# Patient Record
Sex: FEMALE | Race: WHITE | NOT HISPANIC OR LATINO | Employment: FULL TIME | URBAN - METROPOLITAN AREA
[De-identification: names, ages, dates, MRNs, and addresses within clinical notes are randomized per-mention and may not be internally consistent; named-entity substitution may affect disease eponyms.]

---

## 2019-02-19 ENCOUNTER — TRANSFERRED RECORDS (OUTPATIENT)
Dept: HEALTH INFORMATION MANAGEMENT | Facility: CLINIC | Age: 29
End: 2019-02-19

## 2019-03-12 ENCOUNTER — ANCILLARY PROCEDURE (OUTPATIENT)
Dept: ULTRASOUND IMAGING | Facility: CLINIC | Age: 29
End: 2019-03-12
Payer: COMMERCIAL

## 2019-03-12 ENCOUNTER — OFFICE VISIT (OUTPATIENT)
Dept: OBGYN | Facility: CLINIC | Age: 29
End: 2019-03-12
Payer: COMMERCIAL

## 2019-03-12 VITALS
DIASTOLIC BLOOD PRESSURE: 70 MMHG | WEIGHT: 126 LBS | HEIGHT: 64 IN | SYSTOLIC BLOOD PRESSURE: 110 MMHG | BODY MASS INDEX: 21.51 KG/M2

## 2019-03-12 DIAGNOSIS — N83.209 CYST OF OVARY, UNSPECIFIED LATERALITY: ICD-10-CM

## 2019-03-12 DIAGNOSIS — Z97.5 PRESENCE OF INTRAUTERINE CONTRACEPTIVE DEVICE (IUD): ICD-10-CM

## 2019-03-12 DIAGNOSIS — N83.202 LEFT OVARIAN CYST: Primary | ICD-10-CM

## 2019-03-12 PROCEDURE — 76830 TRANSVAGINAL US NON-OB: CPT | Performed by: OBSTETRICS & GYNECOLOGY

## 2019-03-12 PROCEDURE — 99213 OFFICE O/P EST LOW 20 MIN: CPT | Performed by: OBSTETRICS & GYNECOLOGY

## 2019-03-12 ASSESSMENT — MIFFLIN-ST. JEOR: SCORE: 1291.53

## 2019-03-12 NOTE — PROGRESS NOTES
SUBJECTIVE:                                                   Cornelia Louis is a 28 year old female who presents to clinic today for the following health issue(s):  Patient presents with:  Ultrasound: follow-up ovarian cyst. Previous histroy of cyst and patient has starting having pelvic pain.      HPI:  Has IUD. Usually amenorrhea. Had LLQ pain on . Went to  and had ultrasound showing complex left ovarian cyst. Some free fluid.   Pain improved. Pt had spotting which she usually doesn't have.   Pain returned again so came here for repeat ultrasound.    No LMP recorded. Patient is not currently having periods (Reason: IUD)..   Patient is sexually active, .  Using IUD for contraception.    reports that she has quit smoking. she has never used smokeless tobacco.  STD testing offered?  Declined  Health maintenance updated:  due for pap, last pap 16        Problem list and histories reviewed & adjusted, as indicated.  Additional history: as documented.    Patient Active Problem List   Diagnosis     Supervision of normal first pregnancy     Marginal insertion of umbilical cord     Post-dates pregnancy     Indication for care in labor or delivery     Pregnancy     Past Surgical History:   Procedure Laterality Date     wisdom teeth        Social History     Tobacco Use     Smoking status: Former Smoker     Smokeless tobacco: Never Used   Substance Use Topics     Alcohol use: No     Alcohol/week: 0.0 oz      Problem (# of Occurrences) Relation (Name,Age of Onset)    Coronary Artery Disease (1) Unspecified    Fractures (1) Unspecified            Current Outpatient Medications   Medication Sig     levonorgestrel (MIRENA) 20 MCG/24HR IUD 1 each (20 mcg) by Intrauterine route once for 1 dose Lot [id number], Exp 2018     Prenatal Vit-Fe Fumarate-FA (PRENATAL MULTIVITAMIN  PLUS IRON) 27-0.8 MG TABS Take 1 tablet by mouth daily     No current facility-administered medications for this visit.   "    Facility-Administered Medications Ordered in Other Visits   Medication     fentaNYL (SUBLIMAZE) injection     lidocaine-EPINEPHrine 1.5 %-[id number] injection     ROPivacaine (NAROPIN) injection     No Known Allergies    ROS:  12 point review of systems negative other than symptoms noted below.  Head: Sore Throat  Gastrointestinal: Abdominal Pain and Bloating    OBJECTIVE:     /70   Ht 1.626 m (5' 4\")   Wt 57.2 kg (126 lb)   BMI 21.63 kg/m    Body mass index is 21.63 kg/m .    Exam:  Constitutional:  Appearance: Well nourished, well developed alert, in no acute distress  Neurologic/Psychiatric:  Mental Status:  Oriented X3      In-Clinic Test Results:  Results for orders placed or performed in visit on 03/12/19 (from the past 24 hour(s))   US Transvaginal Non OB    Narrative    US Transvaginal Non OB   Order #: [id number] Accession #: [id number]   Study Notes        Yakelin Mccarty on 3/12/2019  2:19 PM   Gynecological Ultrasound Report  Pelvic U/S - Transvaginal  [facility]  Referring Provider: Darian Ayers MD  Sonographer:  Yakelin Mccarty  Indication: Complex left ovarian cyst, left pelvic pain  LMP: No LMP recorded. Patient is not currently having periods (Reason:   IUD).  History:   Gynecological Ultrasonography:   Uterus: anteverted  Size: 6.80 x 5.80 x 3.32 cm  Findings: IUD normal placement    Endometrium: Thickness total 2.92 mm  Right Ovary: 3.95 x 2.75 x 2.63 cm   Left Ovary: 4.59 x 2.12 x 2.24 cm  Cul de Sac/Pouch of Arun:  Small amount of free fluid      Impression:Normal pelvic ultrasound. IUD in proper position. Small amount   of free fluid in culdesac. Outside ultrasound from 2/20/19 showed complex   left ovarian cyst. This has resolved.  Darian Ayers MD                          ASSESSMENT/PLAN:                                                        ICD-10-CM    1. Left ovarian cyst N83.202    2. Presence of intrauterine contraceptive device (IUD) Z97.5  "     Cyst has resolved. Pt most likely ovulated with cyst and then spotting. Will observe for now. Pain should go away.  Needs pap. Not done today due to ultrasound. Will make return appt.    Darian Ayers MD  [facility]

## 2019-04-02 ENCOUNTER — TRANSFERRED RECORDS (OUTPATIENT)
Dept: HEALTH INFORMATION MANAGEMENT | Facility: CLINIC | Age: 29
End: 2019-04-02

## 2019-06-11 ENCOUNTER — TRANSFERRED RECORDS (OUTPATIENT)
Dept: HEALTH INFORMATION MANAGEMENT | Facility: CLINIC | Age: 29
End: 2019-06-11

## 2020-01-13 ENCOUNTER — TRANSFERRED RECORDS (OUTPATIENT)
Dept: HEALTH INFORMATION MANAGEMENT | Facility: CLINIC | Age: 30
End: 2020-01-13

## 2021-06-14 ENCOUNTER — OFFICE VISIT (OUTPATIENT)
Dept: OBGYN | Facility: CLINIC | Age: 31
End: 2021-06-14
Payer: COMMERCIAL

## 2021-06-14 VITALS
RESPIRATION RATE: 17 BRPM | WEIGHT: 130.8 LBS | HEART RATE: 68 BPM | SYSTOLIC BLOOD PRESSURE: 120 MMHG | BODY MASS INDEX: 22.45 KG/M2 | DIASTOLIC BLOOD PRESSURE: 62 MMHG

## 2021-06-14 DIAGNOSIS — Z97.5 IUD (INTRAUTERINE DEVICE) IN PLACE: ICD-10-CM

## 2021-06-14 DIAGNOSIS — Z12.4 CERVICAL CANCER SCREENING: Primary | ICD-10-CM

## 2021-06-14 LAB — HCG UR QL: NEGATIVE

## 2021-06-14 PROCEDURE — 99212 OFFICE O/P EST SF 10 MIN: CPT | Performed by: OBSTETRICS & GYNECOLOGY

## 2021-06-14 PROCEDURE — 87624 HPV HI-RISK TYP POOLED RSLT: CPT | Performed by: OBSTETRICS & GYNECOLOGY

## 2021-06-14 PROCEDURE — 81025 URINE PREGNANCY TEST: CPT | Performed by: OBSTETRICS & GYNECOLOGY

## 2021-06-14 PROCEDURE — G0145 SCR C/V CYTO,THINLAYER,RESCR: HCPCS | Performed by: OBSTETRICS & GYNECOLOGY

## 2021-06-14 NOTE — PROGRESS NOTES
SUBJECTIVE:                                                   Cornelia Louis is a 30 year old female who presents to clinic today for the following health issue(s):  Pt presented for IUD removal and reinsertion but is not due at this time. She requested a pap and pelvic.     HPI:  Presents originally for IUD removal/replacement, but didn't know that Mirena can extend 6yr now.   Happy with her IUD. No concerns. Happy to keep it for now.    No other concerns.     No LMP recorded (lmp unknown). (Menstrual status: IUD)..     Patient is sexually active, .  Using IUD for contraception.    reports that she has quit smoking. She has never used smokeless tobacco.    STD testing offered?  Declined    Health maintenance updated:  yes    Today's PHQ-2 Score: No flowsheet data found.  Today's PHQ-9 Score:   PHQ-9 SCORE 2016   PHQ-9 Total Score 2     Today's BLANK-7 Score:   BLANK-7 SCORE 2016   Total Score 3       Problem list and histories reviewed & adjusted, as indicated.  Additional history: as documented.    Patient Active Problem List   Diagnosis     IUD (intrauterine device) in place     Past Surgical History:   Procedure Laterality Date     wisdom teeth        Social History     Tobacco Use     Smoking status: Former Smoker     Smokeless tobacco: Never Used   Substance Use Topics     Alcohol use: No     Alcohol/week: 0.0 standard drinks      Problem (# of Occurrences) Relation (Name,Age of Onset)    Coronary Artery Disease (1) Other    Fractures (1) Other            Current Outpatient Medications   Medication Sig     levonorgestrel (MIRENA) 20 MCG/24HR IUD 1 each (20 mcg) by Intrauterine route once for 1 dose Lot [id number], Exp 2018     Prenatal Vit-Fe Fumarate-FA (PRENATAL MULTIVITAMIN  PLUS IRON) 27-0.8 MG TABS Take 1 tablet by mouth daily     No current facility-administered medications for this visit.      No Known Allergies    ROS:  12 point review of systems negative other than symptoms noted below  or in the HPI.  No urinary frequency or dysuria, bladder or kidney problems      OBJECTIVE:     /62 (BP Location: Right arm, Patient Position: Sitting, Cuff Size: Adult Regular)   Pulse 68   Resp 17   Wt 59.3 kg (130 lb 12.8 oz)   LMP  (LMP Unknown)   Breastfeeding No   BMI 22.45 kg/m    Body mass index is 22.45 kg/m .    Exam:  Constitutional:  Appearance: Well nourished, well developed alert, in no acute distress  Skin: General Inspection:  No rashes present, no lesions present, no areas of discoloration.  Neurologic:  Mental Status:  Oriented X3.  Normal strength and tone, sensory exam grossly normal, mentation intact and speech normal.    Psychiatric:  Mentation appears normal and affect normal/bright.  Pelvic Exam:  External Genitalia:     Normal appearance for age, no discharge present, no tenderness present, no inflammatory lesions present, color normal  Vagina:    Normal vaginal vault without central or paravaginal defects, no discharge present, no inflammatory lesions present, no masses present  Bladder:     Nontender to palpation  Urethra:   Urethral Body:  Urethra palpation normal, urethra structural support normal   Urethral Meatus:  No erythema or lesions present  Cervix:     Appearance healthy, no lesions present, nontender to palpation, no bleeding present, string present  Uterus:     Nontender to palpation, no masses present, position anteflexed, mobility: normal  Adnexa:     No adnexal tenderness present, no adnexal masses present  Perineum:     Perineum within normal limits, no evidence of trauma, no rashes or skin lesions present  Anus:     Anus within normal limits, no hemorrhoids present  Inguinal Lymph Nodes:     No lymphadenopathy present  Pubic Hair:     Normal pubic hair distribution for age  Genitalia and Groin:     No rashes present, no lesions present, no areas of discoloration, no masses present       In-Clinic Test Results:  Results for orders placed or performed in visit on  06/14/21 (from the past 24 hour(s))   HCG Qual, Urine ([id number])   Result Value Ref Range    HCG Qual Urine Negative NEG^Negative       ASSESSMENT/PLAN:                                                        ICD-10-CM    1. Cervical cancer screening  Z12.4 Pap imaged thin layer screen with HPV - recommended age 30 - 65 years (select HPV order below)     HPV High Risk Types DNA Cervical   2. IUD (intrauterine device) in place  Z97.5            IUD strings in place. Mirena IUD good until 4/2022.  Pap/hpv obtained, turning 31 next mo. Manage per guidelines.  F/U yearly  Questions answered.     Lesia Noonan Masters, DO  [facility] [place]

## 2021-06-16 LAB
COPATH REPORT: NORMAL
PAP: NORMAL

## 2021-06-18 LAB
FINAL DIAGNOSIS: NORMAL
HPV HR 12 DNA CVX QL NAA+PROBE: NEGATIVE
HPV16 DNA SPEC QL NAA+PROBE: NEGATIVE
HPV18 DNA SPEC QL NAA+PROBE: NEGATIVE
SPECIMEN DESCRIPTION: NORMAL
SPECIMEN SOURCE CVX/VAG CYTO: NORMAL

## 2022-06-01 ENCOUNTER — OFFICE VISIT (OUTPATIENT)
Dept: OBGYN | Facility: CLINIC | Age: 32
End: 2022-06-01
Payer: COMMERCIAL

## 2022-06-01 VITALS — WEIGHT: 126 LBS | BODY MASS INDEX: 21.63 KG/M2

## 2022-06-01 DIAGNOSIS — Z30.09 GENERAL COUNSELING AND ADVICE ON CONTRACEPTIVE MANAGEMENT: Primary | ICD-10-CM

## 2022-06-01 PROCEDURE — 99212 OFFICE O/P EST SF 10 MIN: CPT | Performed by: OBSTETRICS & GYNECOLOGY

## 2022-06-01 NOTE — PROGRESS NOTES
SUBJECTIVE:                                                   Cornelia Louis is a 31 year old female who presents to clinic today for the following health issue(s):  Patient presents with:  IUD: IUD removal      HPI:  Wants to discuss hx of ovarian cysts. Thinks will have a cyst on left about 2 times per year.  Last eval/US for this in 2019. LOV cyst had resolved.   Questions about nexplanon.     No periods. No issues on IUD other than concerns for cysts. No hx cysts prior to being on IUD.     No LMP recorded. (Menstrual status: IUD)..     Patient is sexually active, .  Using IUD for contraception.    reports that she has quit smoking. She has never used smokeless tobacco.      Problem list and histories reviewed & adjusted, as indicated.  Additional history: as documented.    Patient Active Problem List   Diagnosis     IUD (intrauterine device) in place     Past Surgical History:   Procedure Laterality Date     wisdom teeth        Social History     Tobacco Use     Smoking status: Former Smoker     Smokeless tobacco: Never Used   Substance Use Topics     Alcohol use: No     Alcohol/week: 0.0 standard drinks      Problem (# of Occurrences) Relation (Name,Age of Onset)    Coronary Artery Disease (1) Other    Fractures (1) Other            Current Outpatient Medications   Medication Sig     levonorgestrel (MIRENA) 20 MCG/24HR IUD 1 each (20 mcg) by Intrauterine route once for 1 dose Lot [id number], Exp 2018     No current facility-administered medications for this visit.     No Known Allergies        OBJECTIVE:     Wt 57.2 kg (126 lb)   Breastfeeding No   BMI 21.63 kg/m    Body mass index is 21.63 kg/m .    Exam:  Constitutional:  Appearance: Well nourished, well developed alert, in no acute distress  Neurologic:  Mental Status:  Oriented X3.  Normal strength and tone, sensory exam grossly normal, mentation intact and speech normal.    Psychiatric:  Mentation appears normal and affect normal/bright.          ASSESSMENT/PLAN:                                                        ICD-10-CM    1. General counseling and advice on contraceptive management  Z30.09          -Reviewed mirena IUD now approved for 7yrs, which is 4/23. Is doing well, no bleeding issues. Prefers to wait the full 7yrs.   Has hx of ovarian cysts. Discussed how/why ovarian cysts happen, how can  Happen on progestin IUD, prolonged resolution possible with levonorgestrel. Discussed when/why to f/u if has prolong sx with cyst.   Discussed nexplanon, expectations.   Questions answered.         Lesia Noonan Masters, DO  [facility] [place]